# Patient Record
Sex: MALE | Race: WHITE | NOT HISPANIC OR LATINO | URBAN - METROPOLITAN AREA
[De-identification: names, ages, dates, MRNs, and addresses within clinical notes are randomized per-mention and may not be internally consistent; named-entity substitution may affect disease eponyms.]

---

## 2017-02-07 ENCOUNTER — OFFICE VISIT - HEALTHEAST (OUTPATIENT)
Dept: FAMILY MEDICINE | Facility: CLINIC | Age: 42
End: 2017-02-07

## 2017-02-07 DIAGNOSIS — Z30.09 ENCOUNTER FOR VASECTOMY COUNSELING: ICD-10-CM

## 2017-02-07 DIAGNOSIS — Z23 NEED FOR VACCINATION: ICD-10-CM

## 2017-02-07 RX ORDER — DIAZEPAM 5 MG
TABLET ORAL
Qty: 2 TABLET | Refills: 0 | Status: SHIPPED | OUTPATIENT
Start: 2017-02-07

## 2017-02-07 ASSESSMENT — MIFFLIN-ST. JEOR: SCORE: 1989.89

## 2017-03-03 ENCOUNTER — AMBULATORY - HEALTHEAST (OUTPATIENT)
Dept: FAMILY MEDICINE | Facility: CLINIC | Age: 42
End: 2017-03-03

## 2017-03-03 DIAGNOSIS — Z30.2 ENCOUNTER FOR VASECTOMY: ICD-10-CM

## 2017-03-03 ASSESSMENT — MIFFLIN-ST. JEOR: SCORE: 1987.17

## 2017-03-08 LAB
LAB AP CHARGES (HE HISTORICAL CONVERSION): NORMAL
PATH REPORT.COMMENTS IMP SPEC: NORMAL
PATH REPORT.FINAL DX SPEC: NORMAL
PATH REPORT.GROSS SPEC: NORMAL
PATH REPORT.MICROSCOPIC SPEC OTHER STN: NORMAL
PATH REPORT.RELEVANT HX SPEC: NORMAL
RESULT FLAG (HE HISTORICAL CONVERSION): NORMAL

## 2017-06-19 ENCOUNTER — COMMUNICATION - HEALTHEAST (OUTPATIENT)
Dept: LAB | Facility: CLINIC | Age: 42
End: 2017-06-19

## 2017-07-19 ENCOUNTER — AMBULATORY - HEALTHEAST (OUTPATIENT)
Dept: LAB | Facility: CLINIC | Age: 42
End: 2017-07-19

## 2017-07-19 ENCOUNTER — COMMUNICATION - HEALTHEAST (OUTPATIENT)
Dept: FAMILY MEDICINE | Facility: CLINIC | Age: 42
End: 2017-07-19

## 2017-07-19 DIAGNOSIS — Z30.2 ENCOUNTER FOR VASECTOMY: ICD-10-CM

## 2017-07-20 ENCOUNTER — COMMUNICATION - HEALTHEAST (OUTPATIENT)
Dept: FAMILY MEDICINE | Facility: CLINIC | Age: 42
End: 2017-07-20

## 2017-07-21 ENCOUNTER — COMMUNICATION - HEALTHEAST (OUTPATIENT)
Dept: FAMILY MEDICINE | Facility: CLINIC | Age: 42
End: 2017-07-21

## 2017-07-21 DIAGNOSIS — Z30.09 VASECTOMY EVALUATION: ICD-10-CM

## 2017-08-08 ENCOUNTER — RECORDS - HEALTHEAST (OUTPATIENT)
Dept: ADMINISTRATIVE | Facility: OTHER | Age: 42
End: 2017-08-08

## 2017-09-18 ENCOUNTER — RECORDS - HEALTHEAST (OUTPATIENT)
Dept: ADMINISTRATIVE | Facility: OTHER | Age: 42
End: 2017-09-18

## 2021-05-30 VITALS — WEIGHT: 236.5 LBS | HEIGHT: 71 IN | BODY MASS INDEX: 33.11 KG/M2

## 2021-05-30 VITALS — HEIGHT: 71 IN | BODY MASS INDEX: 33.02 KG/M2 | WEIGHT: 235.9 LBS

## 2021-06-08 NOTE — PROGRESS NOTES
"Assessment:      Desires elective vasectomy.      Plan:      He was given the consent form, pre-vasectomy instruction sheet, and vasectomy booklet. I extensively reviewed with him the likely postoperative recuperative period as well as the need to continue to use contraception until he is notified by us of his sterility. He will have a semen analysis after 25 ejaculations. He understands the potential side effects of anesthesia, bleeding, scrotal hematoma, wound infection, epididymal orchitis, epididymal congestion, chronic testicular pain requiring further surgery, sperm granuloma, antisperm antibodies, early recanalization, spontaneous recanalization with pregnancy after demonstration of azoospermia and the possible association with prostate cancer. He is aware of the recommendation for PSA and YARY yearly after the age of 50. Patient will schedule the procedure at his earliest convenience.     Subjective:      Trace Marie is a 41 y.o. male who was referred by Anuel Crouch for evaluation regarding elective sterilization. He is aware of alternatives to vasectomy. He has given this careful consideration and wishes to proceed with a vasectomy. He denies hematuria, urinary tract infections, urolithiasis, prostatitis, erectile dysfunction, previous genitourinary surgery, epididymal orchitis, testicular masses, scrotal trauma, sexually transmitted diseases, family history of prostate cancer.  The following portions of the patient's history were reviewed and updated as appropriate: allergies, current medications, past family history, past medical history, past social history, past surgical history and problem list.    Review of Systems  A 12 point comprehensive review of systems was negative except as noted.      Objective:        Visit Vitals     /72 (Patient Site: Left Arm, Patient Position: Sitting, Cuff Size: Adult Large)     Pulse 64     Temp 98.1  F (36.7  C) (Oral)     Resp 12     Ht 5' 11\" (1.803 m) "     Wt (!) 236 lb 8 oz (107.3 kg)     BMI 32.99 kg/m2     General appearance: alert, appears stated age and cooperative  Head: Normocephalic, without obvious abnormality, atraumatic  Lungs: clear to auscultation bilaterally  Heart: regular rate and rhythm, S1, S2 normal, no murmur, click, rub or gallop  Abdomen: soft, non-tender; bowel sounds normal; no masses,  no organomegaly  Male genitalia: penis: no lesions or discharge. testes: no masses or tenderness. no hernias, Circumcised, bilateral vas deferens easily mobilized and brought the skin surface  Extremities: extremities normal, atraumatic, no cyanosis or edema  Pulses: 2+ and symmetric

## 2021-06-09 NOTE — PROGRESS NOTES
"Vasectomy  Date/Time: 3/3/2017 1:07 PM  Performed by: MACHO DAMON  Authorized by: MACHO DAMON   Consent: Verbal consent obtained. Written consent obtained.  Risks and benefits: risks, benefits and alternatives were discussed  Consent given by: patient  Patient understanding: patient states understanding of the procedure being performed  Patient consent: the patient's understanding of the procedure matches consent given  Procedure consent: procedure consent matches procedure scheduled  Relevant documents: relevant documents present and verified  Test results: test results available and properly labeled  Site marked: the operative site was marked  Patient identity confirmed: verbally with patient  Time out: Immediately prior to procedure a \"time out\" was called to verify the correct patient, procedure, equipment, support staff and site/side marked as required.  Preparation: Patient was prepped and draped in the usual sterile fashion.        No Scalpel Vasectomy Procedure Note    Indications: 41 y.o. male desiring permanent sterilization    Pre-operative Diagnosis: Undesired fertility    Post-operative Diagnosis: Undesired fertility    Anesthesia: Lidocaine 1% without epinephrine without added sodium bicarbonate    Procedure Details     The risks and benefits of the procedure were discussed at the pre-procedure consultation, and written, informed consent obtained.    Premedicated with Valium 10 mgm po 30 minutes prior to procedure.    Penis taped to abdomen. The scrotum was palpated with both testes normal in size and position, no masses palpitated. The scrotum was cleansed with warm Betadine and draped in the usual sterile manner.     A vasal sheath block was performed on both the left and right vas.  After adequate anesthesia was established, a small perforation was made in the skin and the left vas was isolated with the ring forceps, dissected free and delivered through the skin " perforation.  The left vas was divided, approximately 1.5 cm portion removed, and each end of the vas was cauterized and suture ligated.  The ends of the vas were replaced in the scrotum through the puncture site.  The right vas was then isolated, divided, cauterized and ligated in a similar fashion.  Midportions removed were sent to pathology to confirm.    Any bleeding was controlled with electrocautery.  The puncture site was dry when the procedure was completed.  After discussion with the patient, the puncture site was left open and antibiotic ointment applied.. Bulky dressing/Kerlix applied to scrotal area and jock strap applied.    Specimen: Segments of left and right Vas    Condition: Stable    Complications: none    Plan:  1. Continue contraception until negative sperm analysis. Semen count after 25 ejaculations.  2. Warning signs of infection were reviewed.   3. Patient is taken home by wife with written home care instructions.  Bedrest X 48 hrs, Ice pack every 3 hours for 24 hrs.    Call the clinic if excessive pain, bleeding or swelling.  No lifting >10 lbs for 2 weeks.   4. Recommended that the patient use OTC analgesics as needed for pain.   5. Return for wound check in as needed.